# Patient Record
Sex: MALE | Race: WHITE | NOT HISPANIC OR LATINO | Employment: OTHER | URBAN - METROPOLITAN AREA
[De-identification: names, ages, dates, MRNs, and addresses within clinical notes are randomized per-mention and may not be internally consistent; named-entity substitution may affect disease eponyms.]

---

## 2020-11-04 ENCOUNTER — HOSPITAL ENCOUNTER (OUTPATIENT)
Dept: RADIOLOGY | Facility: HOSPITAL | Age: 65
Discharge: HOME | End: 2020-11-04
Attending: NURSE PRACTITIONER
Payer: MEDICARE

## 2020-11-04 ENCOUNTER — OFFICE VISIT (OUTPATIENT)
Dept: UROLOGY | Facility: CLINIC | Age: 65
End: 2020-11-04
Payer: MEDICARE

## 2020-11-04 VITALS
HEIGHT: 68 IN | WEIGHT: 141.75 LBS | BODY MASS INDEX: 21.48 KG/M2 | DIASTOLIC BLOOD PRESSURE: 81 MMHG | HEART RATE: 76 BPM | RESPIRATION RATE: 18 BRPM | SYSTOLIC BLOOD PRESSURE: 132 MMHG

## 2020-11-04 DIAGNOSIS — R10.9 FLANK PAIN: Primary | ICD-10-CM

## 2020-11-04 DIAGNOSIS — N20.0 KIDNEY STONES: ICD-10-CM

## 2020-11-04 DIAGNOSIS — R10.9 ABDOMINAL PAIN, UNSPECIFIED ABDOMINAL LOCATION: ICD-10-CM

## 2020-11-04 LAB
BILIRUB SERPL-MCNC: ABNORMAL MG/DL
BLOOD URINE, POC: ABNORMAL
CLARITY, POC UA: ABNORMAL
COLOR, POC UA: ABNORMAL
GLUCOSE UR QL STRIP: ABNORMAL
KETONES UR QL STRIP: ABNORMAL
LEUKOCYTE ESTERASE URINE, POC: ABNORMAL
NITRITE, POC UA: ABNORMAL
PH, POC UA: 5.5
PROTEIN, POC: 100
SPECIFIC GRAVITY, POC UA: 1.03
UROBILINOGEN, POC UA: 1

## 2020-11-04 PROCEDURE — 88112 PR  CYTOPATH, CELL ENHANCE TECH: ICD-10-PCS | Mod: 26,,, | Performed by: PATHOLOGY

## 2020-11-04 PROCEDURE — 74176 CT ABD & PELVIS W/O CONTRAST: CPT | Mod: 26,,, | Performed by: RADIOLOGY

## 2020-11-04 PROCEDURE — 88112 CYTOPATH CELL ENHANCE TECH: CPT | Mod: 26,,, | Performed by: PATHOLOGY

## 2020-11-04 PROCEDURE — 99204 OFFICE O/P NEW MOD 45 MIN: CPT | Mod: S$PBB,,, | Performed by: NURSE PRACTITIONER

## 2020-11-04 PROCEDURE — 99204 OFFICE O/P NEW MOD 45 MIN: CPT | Mod: PBBFAC,PN,25 | Performed by: NURSE PRACTITIONER

## 2020-11-04 PROCEDURE — 74176 CT RENAL STONE STUDY ABD PELVIS WO: ICD-10-PCS | Mod: 26,,, | Performed by: RADIOLOGY

## 2020-11-04 PROCEDURE — 99999 PR PBB SHADOW E&M-NEW PATIENT-LVL IV: ICD-10-PCS | Mod: PBBFAC,,, | Performed by: NURSE PRACTITIONER

## 2020-11-04 PROCEDURE — 99204 PR OFFICE/OUTPT VISIT, NEW, LEVL IV, 45-59 MIN: ICD-10-PCS | Mod: S$PBB,,, | Performed by: NURSE PRACTITIONER

## 2020-11-04 PROCEDURE — 81002 URINALYSIS NONAUTO W/O SCOPE: CPT | Mod: PBBFAC,PN | Performed by: NURSE PRACTITIONER

## 2020-11-04 PROCEDURE — 88112 CYTOPATH CELL ENHANCE TECH: CPT | Performed by: PATHOLOGY

## 2020-11-04 PROCEDURE — 74176 CT ABD & PELVIS W/O CONTRAST: CPT | Mod: TC

## 2020-11-04 PROCEDURE — 99999 PR PBB SHADOW E&M-NEW PATIENT-LVL IV: CPT | Mod: PBBFAC,,, | Performed by: NURSE PRACTITIONER

## 2020-11-04 PROCEDURE — 87086 URINE CULTURE/COLONY COUNT: CPT

## 2020-11-04 RX ORDER — CIPROFLOXACIN 500 MG/1
500 TABLET ORAL 2 TIMES DAILY
Qty: 10 TABLET | Refills: 0 | Status: ON HOLD | OUTPATIENT
Start: 2020-11-04 | End: 2020-11-06 | Stop reason: ALTCHOICE

## 2020-11-04 RX ORDER — OXYCODONE AND ACETAMINOPHEN 5; 325 MG/1; MG/1
TABLET ORAL
COMMUNITY
Start: 2020-10-30

## 2020-11-04 RX ORDER — TAMSULOSIN HYDROCHLORIDE 0.4 MG/1
0.4 CAPSULE ORAL DAILY
Qty: 30 CAPSULE | Refills: 3 | Status: SHIPPED | OUTPATIENT
Start: 2020-11-04 | End: 2021-11-04

## 2020-11-04 RX ORDER — CEPHALEXIN 250 MG/1
CAPSULE ORAL
COMMUNITY
Start: 2020-10-30 | End: 2020-11-04 | Stop reason: ALTCHOICE

## 2020-11-04 NOTE — PROGRESS NOTES
Called patient to discuss CT results. Probable 5 mm stone in mid left ureter, however read is calling outside of ureter. No hydro. Patient is still having pain.   Offered URS on Friday to further evaluate and remove stone if needed.   Patient stated that he only wants ESWL, however I explained this is not an option due to location of stone. He refused stent placement which I explained is always a possibility with ureteroscopy.   He does not want to undergo procedure at this time, will call if he changes his mind.

## 2020-11-04 NOTE — PROGRESS NOTES
[facility] Note  Staff: JOSE Mauro    PCP: Dr. Reji Hannah    Chief Complaint: Hospital ER f/up-Kidney stones    Subjective:        HPI: Americo Dunham Jr. is a 65 y.o. male NEW PT presents today for ER f/up in regards to kidney stones.    The pt went to [facility] ED ([place], [place]) on 10/30/2020 with flank/abdominal pains.  CT RSS was performed and showed the following:  IMPRESSION:  1.  There is a obstructing 5 x 3 mm stone in the middle of the LEFT ureter, with mild to moderate proximal left hydroureteronephrosis, perinephric and periureteral fat stranding.  2.  Right nephrolithiasis.  A punctate stone is also evident in the dependent aspect of the urinary bladder.  3.  Chronic and incidental findings as described above are stable.    Pt states today that the ER MD at [facility] wanted to admit him, but pt refused due to taking care of his ill mother at his home.  Pt was discharged home with   The following meds:  Keflex-pt had to stop taking yesterday due to causing him diarrhea.  Percocet-pt has not taken, does not like taking pain pills.    NO Family hx of  cancers  Pt is not on any blood thinners at this time.    PT  HX:   Flank Pain; kidney cyst; and Benign Prostatic Hypertrophy  Pt was a long time former patient of Dr Le Roa.  Then pt was referred to Dr. Yomi Lawrence in [facility].  The pt had a LEFT partial nephrectomy for kidney abscess done by Dr. Zamora in [facility] in early part of 2000.     Since partial nephrectomy, pt has hx of recurrent kidney stone disease, chronic pain symptoms especially right rib area.  Was seen by a pain doctor in the past and injection therapy was given.  No nausea, fever or chills.  Went to ER and had CT done in [facility] again back in 2/23/15.     REVIEW OF SYSTEMS:  A comprehensive 10 system review was performed and is negative except as noted above in  HPI    PMHx:  Past Medical History:   Diagnosis Date    Back pain     Chronic pain syndrome     Nephrolithiasis     Polio     age 18 months    Polio     Renal abscess     right partial nephrectomy     PSHx:  Past Surgical History:   Procedure Laterality Date    achilles release Right     CHOLECYSTECTOMY      COLONOSCOPY      unknown    keanu      HERNIA REPAIR Left     inguinal    PARTIAL NEPHRECTOMY Right 2000    VARICOCELECTOMY       Allergies:  Patient has no known allergies.    Medications: reviewed with pt during ov today.    Objective:     Vitals:    11/04/20 1358   BP: 132/81   Pulse: 76   Resp: 18     General:WDWN in NAD  Eyes: PERRLA, normal conjunctiva  Respiratory: no increased work on breathing, clear to auscultation  Cardiovascular: regular rate and rhythm. No obvious extremity edema.  GI: palpation of masses. No tenderness. No hepatosplenomegaly to palpation.  Musculoskeletal: normal range of motion of bilateral upper extremities. Normal muscle strength and tone.  Skin: no obvious rashes or lesions. No tightening of skin noted.  Neurologic: CN grossly normal. Normal sensation.   Psychiatric: awake, alert and oriented x 3. Mood and affect normal. Cooperative.    LABS REVIEW:  UA today:ABNORMAL CC  Color:  Brown tinged urine  Spec. Grav.  1.030  PH  5.5  Protein 100  Trace Ketones  Uro 1.0  Small amt bili  Large amount blood  Assessment:       1. Flank pain    2. Kidney stones    3. Abdominal pain, unspecified abdominal location          Plan:   Left Ureteral stone (5x3 mm) and punctate stone within bladder:    Pt requesting surgical intervention immediately for removal of stones.  I explained thoroughly to this pt during ov today, that we will start the workup for the stones today and contact him ASAP in regards to soon availability for intervention and retrieval of stones.  Pt vu.    1.  CT RSS STAT to be done today for further evaluation and to compare to CT performed on 10/30/2020 [place]  [place].  2.  Pt was instructed to stop the Keflex, Cipro 500 mg BID x 5 days prescribed instead.  3.  Urine culture and cytology to be performed.  4.  Flomax 0.4 mg one tablet daily prescribed to pt today to start today.  5.  Urine strainer, specimen cups given to pt today.    F/u---Reviewed pt's hx and imaging with Dr. Jaelyn Porter during ov today.  Pending repeat CT RSS, possibly can schedule pt for surgery this Friday, November 6th.    F/u-Our office will contact this pt after we receive and review ALL imaging and urine results to determine best POC for this patient.  Pt verbalized understanding at conclusion of appointment today.  Pt was instructed to contact our office should their symptoms worsen or any new  complaints.      [facility]: N/A    Liz Magdaleno, RADHAP-C

## 2020-11-04 NOTE — H&P (VIEW-ONLY)
[facility] Note  Staff: JOSE Mauro    PCP: Dr. Reji Hannah    Chief Complaint: Hospital ER f/up-Kidney stones    Subjective:        HPI: Americo Dunham Jr. is a 65 y.o. male NEW PT presents today for ER f/up in regards to kidney stones.    The pt went to [facility] ED ([place], [place]) on 10/30/2020 with flank/abdominal pains.  CT RSS was performed and showed the following:  IMPRESSION:  1.  There is a obstructing 5 x 3 mm stone in the middle of the LEFT ureter, with mild to moderate proximal left hydroureteronephrosis, perinephric and periureteral fat stranding.  2.  Right nephrolithiasis.  A punctate stone is also evident in the dependent aspect of the urinary bladder.  3.  Chronic and incidental findings as described above are stable.    Pt states today that the ER MD at [facility] wanted to admit him, but pt refused due to taking care of his ill mother at his home.  Pt was discharged home with   The following meds:  Keflex-pt had to stop taking yesterday due to causing him diarrhea.  Percocet-pt has not taken, does not like taking pain pills.    NO Family hx of  cancers  Pt is not on any blood thinners at this time.    PT  HX:   Flank Pain; kidney cyst; and Benign Prostatic Hypertrophy  Pt was a long time former patient of Dr Le Roa.  Then pt was referred to Dr. Yomi Lawrence in [facility].  The pt had a LEFT partial nephrectomy for kidney abscess done by Dr. Zamora in [facility] in early part of 2000.     Since partial nephrectomy, pt has hx of recurrent kidney stone disease, chronic pain symptoms especially right rib area.  Was seen by a pain doctor in the past and injection therapy was given.  No nausea, fever or chills.  Went to ER and had CT done in [facility] again back in 2/23/15.     REVIEW OF SYSTEMS:  A comprehensive 10 system review was performed and is negative except as noted above in  HPI    PMHx:  Past Medical History:   Diagnosis Date    Back pain     Chronic pain syndrome     Nephrolithiasis     Polio     age 18 months    Polio     Renal abscess     right partial nephrectomy     PSHx:  Past Surgical History:   Procedure Laterality Date    achilles release Right     CHOLECYSTECTOMY      COLONOSCOPY      unknown    keanu      HERNIA REPAIR Left     inguinal    PARTIAL NEPHRECTOMY Right 2000    VARICOCELECTOMY       Allergies:  Patient has no known allergies.    Medications: reviewed with pt during ov today.    Objective:     Vitals:    11/04/20 1358   BP: 132/81   Pulse: 76   Resp: 18     General:WDWN in NAD  Eyes: PERRLA, normal conjunctiva  Respiratory: no increased work on breathing, clear to auscultation  Cardiovascular: regular rate and rhythm. No obvious extremity edema.  GI: palpation of masses. No tenderness. No hepatosplenomegaly to palpation.  Musculoskeletal: normal range of motion of bilateral upper extremities. Normal muscle strength and tone.  Skin: no obvious rashes or lesions. No tightening of skin noted.  Neurologic: CN grossly normal. Normal sensation.   Psychiatric: awake, alert and oriented x 3. Mood and affect normal. Cooperative.    LABS REVIEW:  UA today:ABNORMAL CC  Color:  Brown tinged urine  Spec. Grav.  1.030  PH  5.5  Protein 100  Trace Ketones  Uro 1.0  Small amt bili  Large amount blood  Assessment:       1. Flank pain    2. Kidney stones    3. Abdominal pain, unspecified abdominal location          Plan:   Left Ureteral stone (5x3 mm) and punctate stone within bladder:    Pt requesting surgical intervention immediately for removal of stones.  I explained thoroughly to this pt during ov today, that we will start the workup for the stones today and contact him ASAP in regards to soon availability for intervention and retrieval of stones.  Pt vu.    1.  CT RSS STAT to be done today for further evaluation and to compare to CT performed on 10/30/2020 at  [facility].  2.  Pt was instructed to stop the Keflex, Cipro 500 mg BID x 5 days prescribed instead.  3.  Urine culture and cytology to be performed.  4.  Flomax 0.4 mg one tablet daily prescribed to pt today to start today.  5.  Urine strainer, specimen cups given to pt today.    F/u---Reviewed pt's hx and imaging with Dr. Jaelyn Porter during ov today.  Pending repeat CT RSS, possibly can schedule pt for surgery this Friday, November 6th.    F/u-Our office will contact this pt after we receive and review ALL imaging and urine results to determine best POC for this patient.  Pt verbalized understanding at conclusion of appointment today.  Pt was instructed to contact our office should their symptoms worsen or any new  complaints.      [facility]: N/A    Liz Magdaleno, RADHAP-C

## 2020-11-04 NOTE — PATIENT INSTRUCTIONS
Understanding Kidney Stones  Your kidneys are bean-shaped organs. They help filter extra salts, waste, and water from your body. You need to drink enough water every day to help flush the extra salts into your urine.     What are kidney stones?  Kidney stones are made up of chemical crystals that separate out from urine. These crystals clump together to make stones. They form in the calyx of the kidney. They may stay in the kidney or move into the urinary tract.   Why kidney stones form  Kidneys form stones for many reasons. If you dont drink enough water, for instance, you wont have enough urine to dilute chemicals. Then the chemicals may form crystals, which can develop into stones. Here are some reasons why kidney stones form:  · Fluid loss (dehydration). This can concentrate urine, causing stones to form.  · Certain foods. Some foods contain large amounts of the chemicals that sometimes crystallize into stones. Eating foods that contain a lot of meat or salt can lead to more kidney stones.  · Kidney infections. These infections foster stones by slowing urine flow or changing the acid balance of your urine.  · Family history. If family members have had kidney stones, youre more likely to have them, too.  · A lack of certain substances in your urine. Some substances can help protect you from forming stones. If you dont have enough of these in your urine, stone formation can increase.  Where stones form  Stones begin in the cup-shaped part of the kidney (calyx). Some stay in the calyx and grow. Others move into the kidney, pelvis, or into the ureter. There they can lodge, block the flow of urine, and cause pain.  Symptoms  Many stones cause sudden and severe pain and bloody urine. Others cause nausea or frequent, burning urination. Symptoms often depend on your stones size and location. Fever may indicate a serious infection. Call your healthcare provider right away if you develop a fever.  Date Last  Reviewed: 1/1/2017  © 4600-3623 [employer] [place]. [address] [place] [address], [place] [address]. All rights reserved. This information is not intended as a substitute for professional medical care. Always follow your healthcare professional's instructions.

## 2020-11-04 NOTE — H&P (VIEW-ONLY)
[facility] Note  Staff: JOSE Mauro    PCP: Dr. Reji Hannah    Chief Complaint: Hospital ER f/up-Kidney stones    Subjective:        HPI: Americo Dunham Jr. is a 65 y.o. male NEW PT presents today for ER f/up in regards to kidney stones.    The pt went to [facility] ED ([place], [place]) on 10/30/2020 with flank/abdominal pains.  CT RSS was performed and showed the following:  IMPRESSION:  1.  There is a obstructing 5 x 3 mm stone in the middle of the LEFT ureter, with mild to moderate proximal left hydroureteronephrosis, perinephric and periureteral fat stranding.  2.  Right nephrolithiasis.  A punctate stone is also evident in the dependent aspect of the urinary bladder.  3.  Chronic and incidental findings as described above are stable.    Pt states today that the ER MD at [facility] wanted to admit him, but pt refused due to taking care of his ill mother at his home.  Pt was discharged home with   The following meds:  Keflex-pt had to stop taking yesterday due to causing him diarrhea.  Percocet-pt has not taken, does not like taking pain pills.    NO Family hx of  cancers  Pt is not on any blood thinners at this time.    PT  HX:   Flank Pain; kidney cyst; and Benign Prostatic Hypertrophy  Pt was a long time former patient of Dr Le Roa.  Then pt was referred to Dr. Yomi Lawrence in [facility].  The pt had a LEFT partial nephrectomy for kidney abscess done by Dr. Zamora in [facility] in early part of 2000.     Since partial nephrectomy, pt has hx of recurrent kidney stone disease, chronic pain symptoms especially right rib area.  Was seen by a pain doctor in the past and injection therapy was given.  No nausea, fever or chills.  Went to ER and had CT done in [facility] again back in 2/23/15.     REVIEW OF SYSTEMS:  A comprehensive 10 system review was performed and is negative except as noted above in  HPI    PMHx:  Past Medical History:   Diagnosis Date    Back pain     Chronic pain syndrome     Nephrolithiasis     Polio     age 18 months    Polio     Renal abscess     right partial nephrectomy     PSHx:  Past Surgical History:   Procedure Laterality Date    achilles release Right     CHOLECYSTECTOMY      COLONOSCOPY      unknown    keanu      HERNIA REPAIR Left     inguinal    PARTIAL NEPHRECTOMY Right 2000    VARICOCELECTOMY       Allergies:  Patient has no known allergies.    Medications: reviewed with pt during ov today.    Objective:     Vitals:    11/04/20 1358   BP: 132/81   Pulse: 76   Resp: 18     General:WDWN in NAD  Eyes: PERRLA, normal conjunctiva  Respiratory: no increased work on breathing, clear to auscultation  Cardiovascular: regular rate and rhythm. No obvious extremity edema.  GI: palpation of masses. No tenderness. No hepatosplenomegaly to palpation.  Musculoskeletal: normal range of motion of bilateral upper extremities. Normal muscle strength and tone.  Skin: no obvious rashes or lesions. No tightening of skin noted.  Neurologic: CN grossly normal. Normal sensation.   Psychiatric: awake, alert and oriented x 3. Mood and affect normal. Cooperative.    LABS REVIEW:  UA today:ABNORMAL CC  Color:  Brown tinged urine  Spec. Grav.  1.030  PH  5.5  Protein 100  Trace Ketones  Uro 1.0  Small amt bili  Large amount blood  Assessment:       1. Flank pain    2. Kidney stones    3. Abdominal pain, unspecified abdominal location          Plan:   Left Ureteral stone (5x3 mm) and punctate stone within bladder:    Pt requesting surgical intervention immediately for removal of stones.  I explained thoroughly to this pt during ov today, that we will start the workup for the stones today and contact him ASAP in regards to soon availability for intervention and retrieval of stones.  Pt vu.    1.  CT RSS STAT to be done today for further evaluation and to compare to CT performed on 10/30/2020 at  [facility].  2.  Pt was instructed to stop the Keflex, Cipro 500 mg BID x 5 days prescribed instead.  3.  Urine culture and cytology to be performed.  4.  Flomax 0.4 mg one tablet daily prescribed to pt today to start today.  5.  Urine strainer, specimen cups given to pt today.    F/u---Reviewed pt's hx and imaging with Dr. Jaelyn Porter during ov today.  Pending repeat CT RSS, possibly can schedule pt for surgery this Friday, November 6th.    F/u-Our office will contact this pt after we receive and review ALL imaging and urine results to determine best POC for this patient.  Pt verbalized understanding at conclusion of appointment today.  Pt was instructed to contact our office should their symptoms worsen or any new  complaints.      [facility]: N/A    Liz Magdaleno, RADHAP-C

## 2020-11-05 ENCOUNTER — TELEPHONE (OUTPATIENT)
Dept: UROLOGY | Facility: CLINIC | Age: 65
End: 2020-11-05

## 2020-11-05 DIAGNOSIS — N20.0 KIDNEY STONES: Primary | ICD-10-CM

## 2020-11-05 NOTE — TELEPHONE ENCOUNTER
----- Message from Noah Silverman sent at 11/5/2020  9:08 AM CST -----  Regarding: pt  Type: Needs Medical Advice    Who Called:  pt    Best Call Back Number: [phone]   Additional Information: pt was in office yesterday. Discussed having procedure tomorrow. Pt is calling back to discuss this. Please call.

## 2020-11-05 NOTE — PRE-PROCEDURE INSTRUCTIONS
Pre-op phone call made to pt.  All medications reviewed and  pre-procedure  instructions given to pt.  Pt verbalized understanding.Pt to be here for 8:00am.

## 2020-11-06 ENCOUNTER — HOSPITAL ENCOUNTER (OUTPATIENT)
Facility: HOSPITAL | Age: 65
Discharge: HOME | End: 2020-11-06
Attending: STUDENT IN AN ORGANIZED HEALTH CARE EDUCATION/TRAINING PROGRAM | Admitting: STUDENT IN AN ORGANIZED HEALTH CARE EDUCATION/TRAINING PROGRAM
Payer: MEDICARE

## 2020-11-06 ENCOUNTER — TELEPHONE (OUTPATIENT)
Dept: UROLOGY | Facility: CLINIC | Age: 65
End: 2020-11-06

## 2020-11-06 ENCOUNTER — ANESTHESIA EVENT (OUTPATIENT)
Dept: SURGERY | Facility: HOSPITAL | Age: 65
End: 2020-11-06
Payer: MEDICARE

## 2020-11-06 ENCOUNTER — ANESTHESIA (OUTPATIENT)
Dept: SURGERY | Facility: HOSPITAL | Age: 65
End: 2020-11-06
Payer: MEDICARE

## 2020-11-06 DIAGNOSIS — N20.1 LEFT URETERAL CALCULUS: ICD-10-CM

## 2020-11-06 DIAGNOSIS — Z01.818 PRE-OP EXAM: ICD-10-CM

## 2020-11-06 DIAGNOSIS — N20.0 KIDNEY STONES: Primary | ICD-10-CM

## 2020-11-06 DIAGNOSIS — N20.1 LEFT URETERAL CALCULUS: Primary | ICD-10-CM

## 2020-11-06 LAB
ANION GAP SERPL CALC-SCNC: 7 MMOL/L (ref 8–16)
BACTERIA UR CULT: NO GROWTH
BASOPHILS # BLD AUTO: 0.05 K/UL (ref 0–0.2)
BASOPHILS NFR BLD: 0.9 % (ref 0–1.9)
BUN SERPL-MCNC: 19 MG/DL (ref 8–23)
CALCIUM SERPL-MCNC: 9.4 MG/DL (ref 8.7–10.5)
CHLORIDE SERPL-SCNC: 106 MMOL/L (ref 95–110)
CO2 SERPL-SCNC: 28 MMOL/L (ref 23–29)
CREAT SERPL-MCNC: 1.2 MG/DL (ref 0.5–1.4)
DIFFERENTIAL METHOD: NORMAL
EOSINOPHIL # BLD AUTO: 0.3 K/UL (ref 0–0.5)
EOSINOPHIL NFR BLD: 5.1 % (ref 0–8)
ERYTHROCYTE [DISTWIDTH] IN BLOOD BY AUTOMATED COUNT: 12.5 % (ref 11.5–14.5)
EST. GFR  (AFRICAN AMERICAN): >60 ML/MIN/1.73 M^2
EST. GFR  (NON AFRICAN AMERICAN): >60 ML/MIN/1.73 M^2
GLUCOSE SERPL-MCNC: 95 MG/DL (ref 70–110)
HCT VFR BLD AUTO: 46 % (ref 40–54)
HGB BLD-MCNC: 15 G/DL (ref 14–18)
IMM GRANULOCYTES # BLD AUTO: 0.02 K/UL (ref 0–0.04)
IMM GRANULOCYTES NFR BLD AUTO: 0.3 % (ref 0–0.5)
LYMPHOCYTES # BLD AUTO: 1.1 K/UL (ref 1–4.8)
LYMPHOCYTES NFR BLD: 18.9 % (ref 18–48)
MCH RBC QN AUTO: 30.3 PG (ref 27–31)
MCHC RBC AUTO-ENTMCNC: 32.6 G/DL (ref 32–36)
MCV RBC AUTO: 93 FL (ref 82–98)
MONOCYTES # BLD AUTO: 0.6 K/UL (ref 0.3–1)
MONOCYTES NFR BLD: 9.7 % (ref 4–15)
NEUTROPHILS # BLD AUTO: 3.8 K/UL (ref 1.8–7.7)
NEUTROPHILS NFR BLD: 65.1 % (ref 38–73)
NRBC BLD-RTO: 0 /100 WBC
PLATELET # BLD AUTO: 225 K/UL (ref 150–350)
PMV BLD AUTO: 10.1 FL (ref 9.2–12.9)
POTASSIUM SERPL-SCNC: 4.3 MMOL/L (ref 3.5–5.1)
RBC # BLD AUTO: 4.95 M/UL (ref 4.6–6.2)
SARS-COV-2 RDRP RESP QL NAA+PROBE: NEGATIVE
SODIUM SERPL-SCNC: 141 MMOL/L (ref 136–145)
WBC # BLD AUTO: 5.86 K/UL (ref 3.9–12.7)

## 2020-11-06 PROCEDURE — 25000003 PHARM REV CODE 250: Performed by: NURSE ANESTHETIST, CERTIFIED REGISTERED

## 2020-11-06 PROCEDURE — 82365 CALCULUS SPECTROSCOPY: CPT

## 2020-11-06 PROCEDURE — D9220A PRA ANESTHESIA: Mod: CRNA,,, | Performed by: NURSE ANESTHETIST, CERTIFIED REGISTERED

## 2020-11-06 PROCEDURE — 99900104 DSU ONLY-NO CHARGE-EA ADD'L HR (STAT): Performed by: STUDENT IN AN ORGANIZED HEALTH CARE EDUCATION/TRAINING PROGRAM

## 2020-11-06 PROCEDURE — C2617 STENT, NON-COR, TEM W/O DEL: HCPCS | Performed by: STUDENT IN AN ORGANIZED HEALTH CARE EDUCATION/TRAINING PROGRAM

## 2020-11-06 PROCEDURE — 93010 EKG 12-LEAD: ICD-10-PCS | Mod: ,,, | Performed by: SPECIALIST

## 2020-11-06 PROCEDURE — 52332 CYSTOSCOPY AND TREATMENT: CPT | Mod: LT,,, | Performed by: STUDENT IN AN ORGANIZED HEALTH CARE EDUCATION/TRAINING PROGRAM

## 2020-11-06 PROCEDURE — 71000016 HC POSTOP RECOV ADDL HR: Performed by: STUDENT IN AN ORGANIZED HEALTH CARE EDUCATION/TRAINING PROGRAM

## 2020-11-06 PROCEDURE — 71000015 HC POSTOP RECOV 1ST HR: Performed by: STUDENT IN AN ORGANIZED HEALTH CARE EDUCATION/TRAINING PROGRAM

## 2020-11-06 PROCEDURE — U0002 COVID-19 LAB TEST NON-CDC: HCPCS

## 2020-11-06 PROCEDURE — 36415 COLL VENOUS BLD VENIPUNCTURE: CPT

## 2020-11-06 PROCEDURE — 71000033 HC RECOVERY, INTIAL HOUR: Performed by: STUDENT IN AN ORGANIZED HEALTH CARE EDUCATION/TRAINING PROGRAM

## 2020-11-06 PROCEDURE — D9220A PRA ANESTHESIA: ICD-10-PCS | Mod: CRNA,,, | Performed by: NURSE ANESTHETIST, CERTIFIED REGISTERED

## 2020-11-06 PROCEDURE — 99900103 DSU ONLY-NO CHARGE-INITIAL HR (STAT): Performed by: STUDENT IN AN ORGANIZED HEALTH CARE EDUCATION/TRAINING PROGRAM

## 2020-11-06 PROCEDURE — 74420 UROGRAPHY RTRGR +-KUB: CPT | Mod: 26,,, | Performed by: STUDENT IN AN ORGANIZED HEALTH CARE EDUCATION/TRAINING PROGRAM

## 2020-11-06 PROCEDURE — 93005 ELECTROCARDIOGRAM TRACING: CPT | Mod: 59

## 2020-11-06 PROCEDURE — 25000003 PHARM REV CODE 250: Performed by: ANESTHESIOLOGY

## 2020-11-06 PROCEDURE — 27200651 HC AIRWAY, LMA: Performed by: ANESTHESIOLOGY

## 2020-11-06 PROCEDURE — 93010 ELECTROCARDIOGRAM REPORT: CPT | Mod: ,,, | Performed by: SPECIALIST

## 2020-11-06 PROCEDURE — C1769 GUIDE WIRE: HCPCS | Performed by: STUDENT IN AN ORGANIZED HEALTH CARE EDUCATION/TRAINING PROGRAM

## 2020-11-06 PROCEDURE — 36000706: Performed by: STUDENT IN AN ORGANIZED HEALTH CARE EDUCATION/TRAINING PROGRAM

## 2020-11-06 PROCEDURE — 63600175 PHARM REV CODE 636 W HCPCS: Performed by: NURSE ANESTHETIST, CERTIFIED REGISTERED

## 2020-11-06 PROCEDURE — 63600175 PHARM REV CODE 636 W HCPCS: Performed by: STUDENT IN AN ORGANIZED HEALTH CARE EDUCATION/TRAINING PROGRAM

## 2020-11-06 PROCEDURE — 85025 COMPLETE CBC W/AUTO DIFF WBC: CPT

## 2020-11-06 PROCEDURE — 36000707: Performed by: STUDENT IN AN ORGANIZED HEALTH CARE EDUCATION/TRAINING PROGRAM

## 2020-11-06 PROCEDURE — 52332 PR CYSTOSCOPY,INSERT URETERAL STENT: ICD-10-PCS | Mod: LT,,, | Performed by: STUDENT IN AN ORGANIZED HEALTH CARE EDUCATION/TRAINING PROGRAM

## 2020-11-06 PROCEDURE — 74420 PR  X-RAY RETROGRADE PYELOGRAM: ICD-10-PCS | Mod: 26,,, | Performed by: STUDENT IN AN ORGANIZED HEALTH CARE EDUCATION/TRAINING PROGRAM

## 2020-11-06 PROCEDURE — 80048 BASIC METABOLIC PNL TOTAL CA: CPT

## 2020-11-06 PROCEDURE — 37000008 HC ANESTHESIA 1ST 15 MINUTES: Performed by: STUDENT IN AN ORGANIZED HEALTH CARE EDUCATION/TRAINING PROGRAM

## 2020-11-06 PROCEDURE — 25500020 PHARM REV CODE 255: Performed by: STUDENT IN AN ORGANIZED HEALTH CARE EDUCATION/TRAINING PROGRAM

## 2020-11-06 PROCEDURE — D9220A PRA ANESTHESIA: ICD-10-PCS | Mod: ANES,,, | Performed by: ANESTHESIOLOGY

## 2020-11-06 PROCEDURE — D9220A PRA ANESTHESIA: Mod: ANES,,, | Performed by: ANESTHESIOLOGY

## 2020-11-06 PROCEDURE — 37000009 HC ANESTHESIA EA ADD 15 MINS: Performed by: STUDENT IN AN ORGANIZED HEALTH CARE EDUCATION/TRAINING PROGRAM

## 2020-11-06 DEVICE — STENT URETERAL UNIV 6FR 26CM: Type: IMPLANTABLE DEVICE | Site: URETER | Status: FUNCTIONAL

## 2020-11-06 RX ORDER — OXYCODONE AND ACETAMINOPHEN 5; 325 MG/1; MG/1
1 TABLET ORAL EVERY 4 HOURS PRN
Qty: 15 TABLET | Refills: 0 | Status: SHIPPED | OUTPATIENT
Start: 2020-11-06

## 2020-11-06 RX ORDER — FENTANYL CITRATE 50 UG/ML
INJECTION, SOLUTION INTRAMUSCULAR; INTRAVENOUS
Status: DISCONTINUED | OUTPATIENT
Start: 2020-11-06 | End: 2020-11-06

## 2020-11-06 RX ORDER — FENTANYL CITRATE 50 UG/ML
25 INJECTION, SOLUTION INTRAMUSCULAR; INTRAVENOUS EVERY 5 MIN PRN
Status: DISCONTINUED | OUTPATIENT
Start: 2020-11-06 | End: 2020-11-06 | Stop reason: HOSPADM

## 2020-11-06 RX ORDER — MIDAZOLAM HYDROCHLORIDE 1 MG/ML
INJECTION, SOLUTION INTRAMUSCULAR; INTRAVENOUS
Status: DISCONTINUED | OUTPATIENT
Start: 2020-11-06 | End: 2020-11-06

## 2020-11-06 RX ORDER — CEFAZOLIN SODIUM 2 G/50ML
2 SOLUTION INTRAVENOUS
Status: COMPLETED | OUTPATIENT
Start: 2020-11-06 | End: 2020-11-06

## 2020-11-06 RX ORDER — OXYBUTYNIN CHLORIDE 5 MG/1
5 TABLET ORAL 3 TIMES DAILY PRN
Qty: 30 TABLET | Refills: 0 | Status: SHIPPED | OUTPATIENT
Start: 2020-11-06

## 2020-11-06 RX ORDER — LIDOCAINE HCL/PF 100 MG/5ML
SYRINGE (ML) INTRAVENOUS
Status: DISCONTINUED | OUTPATIENT
Start: 2020-11-06 | End: 2020-11-06

## 2020-11-06 RX ORDER — SODIUM CHLORIDE 9 MG/ML
INJECTION, SOLUTION INTRAVENOUS CONTINUOUS
Status: DISCONTINUED | OUTPATIENT
Start: 2020-11-06 | End: 2020-11-06 | Stop reason: HOSPADM

## 2020-11-06 RX ORDER — ONDANSETRON 4 MG/1
8 TABLET, ORALLY DISINTEGRATING ORAL EVERY 8 HOURS PRN
Status: DISCONTINUED | OUTPATIENT
Start: 2020-11-06 | End: 2020-11-06 | Stop reason: HOSPADM

## 2020-11-06 RX ORDER — ONDANSETRON HYDROCHLORIDE 2 MG/ML
INJECTION, SOLUTION INTRAMUSCULAR; INTRAVENOUS
Status: DISCONTINUED | OUTPATIENT
Start: 2020-11-06 | End: 2020-11-06

## 2020-11-06 RX ORDER — ACETAMINOPHEN 10 MG/ML
INJECTION, SOLUTION INTRAVENOUS
Status: DISCONTINUED | OUTPATIENT
Start: 2020-11-06 | End: 2020-11-06

## 2020-11-06 RX ORDER — KETOROLAC TROMETHAMINE 30 MG/ML
30 INJECTION, SOLUTION INTRAMUSCULAR; INTRAVENOUS ONCE
Status: DISCONTINUED | OUTPATIENT
Start: 2020-11-06 | End: 2020-11-06 | Stop reason: HOSPADM

## 2020-11-06 RX ORDER — SCOLOPAMINE TRANSDERMAL SYSTEM 1 MG/1
1 PATCH, EXTENDED RELEASE TRANSDERMAL
Status: DISCONTINUED | OUTPATIENT
Start: 2020-11-06 | End: 2020-11-06 | Stop reason: HOSPADM

## 2020-11-06 RX ORDER — PROPOFOL 10 MG/ML
VIAL (ML) INTRAVENOUS
Status: DISCONTINUED | OUTPATIENT
Start: 2020-11-06 | End: 2020-11-06

## 2020-11-06 RX ORDER — OXYCODONE HYDROCHLORIDE 5 MG/1
5 TABLET ORAL ONCE AS NEEDED
Status: COMPLETED | OUTPATIENT
Start: 2020-11-06 | End: 2020-11-06

## 2020-11-06 RX ORDER — DEXAMETHASONE SODIUM PHOSPHATE 4 MG/ML
INJECTION, SOLUTION INTRA-ARTICULAR; INTRALESIONAL; INTRAMUSCULAR; INTRAVENOUS; SOFT TISSUE
Status: DISCONTINUED | OUTPATIENT
Start: 2020-11-06 | End: 2020-11-06

## 2020-11-06 RX ORDER — ONDANSETRON 2 MG/ML
4 INJECTION INTRAMUSCULAR; INTRAVENOUS ONCE AS NEEDED
Status: DISCONTINUED | OUTPATIENT
Start: 2020-11-06 | End: 2020-11-06 | Stop reason: HOSPADM

## 2020-11-06 RX ORDER — LIDOCAINE HYDROCHLORIDE 20 MG/ML
JELLY TOPICAL
Status: DISCONTINUED | OUTPATIENT
Start: 2020-11-06 | End: 2020-11-06 | Stop reason: HOSPADM

## 2020-11-06 RX ADMIN — SCOPALAMINE 1 PATCH: 1 PATCH, EXTENDED RELEASE TRANSDERMAL at 09:11

## 2020-11-06 RX ADMIN — ACETAMINOPHEN 1000 MG: 10 INJECTION, SOLUTION INTRAVENOUS at 10:11

## 2020-11-06 RX ADMIN — PROPOFOL 150 MG: 10 INJECTION, EMULSION INTRAVENOUS at 10:11

## 2020-11-06 RX ADMIN — DEXAMETHASONE SODIUM PHOSPHATE 4 MG: 4 INJECTION, SOLUTION INTRA-ARTICULAR; INTRALESIONAL; INTRAMUSCULAR; INTRAVENOUS; SOFT TISSUE at 10:11

## 2020-11-06 RX ADMIN — ONDANSETRON 4 MG: 2 INJECTION, SOLUTION INTRAMUSCULAR; INTRAVENOUS at 10:11

## 2020-11-06 RX ADMIN — SODIUM CHLORIDE, SODIUM GLUCONATE, SODIUM ACETATE, POTASSIUM CHLORIDE, MAGNESIUM CHLORIDE, SODIUM PHOSPHATE, DIBASIC, AND POTASSIUM PHOSPHATE: .53; .5; .37; .037; .03; .012; .00082 INJECTION, SOLUTION INTRAVENOUS at 10:11

## 2020-11-06 RX ADMIN — FENTANYL CITRATE 50 MCG: 50 INJECTION, SOLUTION INTRAMUSCULAR; INTRAVENOUS at 10:11

## 2020-11-06 RX ADMIN — SODIUM CHLORIDE, SODIUM GLUCONATE, SODIUM ACETATE, POTASSIUM CHLORIDE, MAGNESIUM CHLORIDE, SODIUM PHOSPHATE, DIBASIC, AND POTASSIUM PHOSPHATE: .53; .5; .37; .037; .03; .012; .00082 INJECTION, SOLUTION INTRAVENOUS at 09:11

## 2020-11-06 RX ADMIN — CEFAZOLIN SODIUM 2 G: 2 SOLUTION INTRAVENOUS at 10:11

## 2020-11-06 RX ADMIN — LIDOCAINE HYDROCHLORIDE 75 MG: 20 INJECTION, SOLUTION INTRAVENOUS at 10:11

## 2020-11-06 RX ADMIN — OXYCODONE HYDROCHLORIDE 5 MG: 5 TABLET ORAL at 11:11

## 2020-11-06 RX ADMIN — MIDAZOLAM 2 MG: 1 INJECTION INTRAMUSCULAR; INTRAVENOUS at 10:11

## 2020-11-06 NOTE — TRANSFER OF CARE
Anesthesia Transfer of Care Note    Patient: Americo Dunham Jr.    Procedure(s) Performed: Procedure(s) (LRB):  INSERTION, STENT, URETER (N/A)  REMOVAL, CALCULUS, BLADDER (N/A)  URETEROSCOPY (N/A)    Patient location: PACU    Anesthesia Type: general    Transport from OR: Transported from OR on 2-3 L/min O2 by NC with adequate spontaneous ventilation    Post pain: adequate analgesia    Post assessment: no apparent anesthetic complications and tolerated procedure well    Post vital signs: stable    Level of consciousness: awake, alert and oriented    Nausea/Vomiting: no nausea/vomiting    Complications: none    Transfer of care protocol was followed      Last vitals:   Visit Vitals  BP (!) 102/55 (BP Location: Right arm, Patient Position: Lying)   Pulse 68   Temp 36.5 °C (97.7 °F) (Temporal)   Resp 16   SpO2 95%

## 2020-11-06 NOTE — PLAN OF CARE
Phone called to Dr. Porter with pt on speaker phone. Pt still unable to void. Bladder scanner 300mL. Dr. Porter wanted to have a catheter place till Monday but pt refused. But Pt agree to place harris catheter and to wait and see if he can handle it the catheter in. Pt thinks he will have to go to the ER due to pain from catheter. Dr. Porter informed pt she sent him home the medication for pain. Lidocaine jelly ordered per Dr. Porter.

## 2020-11-06 NOTE — INTERVAL H&P NOTE
The patient has been examined and the H&P has been reviewed:    I concur with the findings and no changes have occurred since H&P was written.    Surgery risks, benefits and alternative options discussed and understood by patient/family.    To OR today for left URS      Active Hospital Problems    Diagnosis  POA    Left ureteral calculus [N20.1]  Yes      Resolved Hospital Problems   No resolved problems to display.

## 2020-11-06 NOTE — TELEPHONE ENCOUNTER
----- Message from Jaelyn Porter MD sent at 11/6/2020 12:11 PM CST -----  Please schedule COVID prior to stent removal on 11/18  Thanks

## 2020-11-06 NOTE — DISCHARGE SUMMARY
[facility]  Discharge Note  Short Stay    Admit Date: 11/6/2020    Discharge Date and Time: 11/06/2020 11:12 AM      Attending Physician: Jaelyn Porter MD     Discharge Provider: Jaelyn Porter    Diagnoses:  Active Hospital Problems    Diagnosis  POA    *Left ureteral calculus [N20.1]  Yes    BPH (benign prostatic hypertrophy) [N40.0]  Yes     Dx updated per 2019 IMO Load        Resolved Hospital Problems   No resolved problems to display.       Discharged Condition: good    Hospital Course: Patient was admitted for left URS and tolerated the procedure well with no complications. The patient was discharged home in good condition on the same day.       Final Diagnoses: Same as principal problem.    Disposition: Home or Self Care    Follow up/Patient Instructions:    Medications:  Reconciled Home Medications:   Current Discharge Medication List      START taking these medications    Details   oxybutynin (DITROPAN) 5 MG Tab Take 1 tablet (5 mg total) by mouth 3 (three) times daily as needed.  Qty: 30 tablet, Refills: 0      !! oxyCODONE-acetaminophen (PERCOCET) 5-325 mg per tablet Take 1 tablet by mouth every 4 (four) hours as needed for Pain.  Qty: 15 tablet, Refills: 0       !! - Potential duplicate medications found. Please discuss with provider.      CONTINUE these medications which have NOT CHANGED    Details   loratadine (CLARITIN) 10 mg tablet Take 1 tablet (10 mg total) by mouth once daily.  Qty: 30 tablet, Refills: 2    Associated Diagnoses: Chronic coughing      !! oxyCODONE-acetaminophen (PERCOCET) 5-325 mg per tablet     Comments: Quantity prescribed more than 7 day supply? Press F2 and select one:[id number]        pantoprazole (PROTONIX) 40 MG tablet Take 1 tablet (40 mg total) by mouth once daily.  Qty: 30 tablet, Refills: 2    Associated Diagnoses: Chronic coughing      tamsulosin (FLOMAX) 0.4 mg Cap Take 1 capsule (0.4 mg total) by mouth once daily. Take at bedtime  Qty: 30 capsule,  Refills: 3       !! - Potential duplicate medications found. Please discuss with provider.        Discharge Procedure Orders   Diet general     Call MD for:  temperature >100.4     Call MD for:  persistent nausea and vomiting     Call MD for:  severe uncontrolled pain     No dressing needed     Activity as tolerated     Follow-up Information     Jaelyn Porter MD On 11/18/2020.    Specialty: Urology  Why: stent removal  Contact information:  [address] [place] [address]  [address] [place] [id number]  [phone]                   Jaelyn Porter MD  Urology Department

## 2020-11-06 NOTE — PLAN OF CARE
Bladder scanned done again- 250mL. Dr. Porter informed- stated to wait. Dr. Su informed of pt in pain- ordered ketorolac 30mg IV

## 2020-11-06 NOTE — PLAN OF CARE
Dr. Porter came back to assess pt. Dr. Porter gave pt some options- pt agreed to have and in & out catheter and to wait to use the restroom. In and out performed 450mL out. Red urine with clots. Pt stated he felt better. New bag of IV fluids hung and apple juice given to pt.

## 2020-11-06 NOTE — ANESTHESIA PROCEDURE NOTES
Intubation  Performed by: Brian Faria CRNA  Authorized by: Gilberto Su MD     Intubation:     Induction:  Intravenous    Intubated:  Postinduction    Attempted By:  CRNA    Difficult Airway Encountered?: No      Complications:  None    Airway Device:  Supraglottic airway/LMA    Airway Device Size:  4.0    Style/Cuff Inflation:  Cuffed (inflated to minimal occlusive pressure)    Secured at:  The lips    Placement Verified By:  Capnometry    Complicating Factors:  None

## 2020-11-06 NOTE — OP NOTE
[facility]  Operative Note    Date: 11/06/2020    Pre-Op Diagnosis:   1. left ureteral stone    Patient Active Problem List   Diagnosis    Nephrolithiasis    Right flank pain, chronic    BPH (benign prostatic hypertrophy)    Intercostal neuralgia    Myalgia and myositis    Facet syndrome    Left ureteral calculus       Post-Op Diagnosis: same    Procedure(s) Performed:   1.  Left ureteroscopy  2.  Cystoscopy  3.  Left retrograde pyelogram  4.  Left ureteral stent placement  5.  Stone extraction   6.  Fluoro < 1 h    Specimen(s): stone for analysis    Staff Surgeon: Jaelyn Porter MD    Anesthesia: General endotracheal anesthesia    Indications: Americo Dunham Jr. is a 65 y.o. male with a left ureteral stone, presenting for definitive stone management.  He currently does not have a JJ ureteral stent in place.      Findings:   - few stones present within bladder, removed  - difficultly advancing the ureteroscope up through the mid ureter secondary to edema and tightness  - able to advance flexible scope over wire, no stones seen within the ureter  - small amount of extravasation seen on RGP    Estimated Blood Loss: min    Drains: 6 Fr x 26 cm JJ ureteral stent without strings    Procedure in detail:  After informed consent was obtained, the patient was brought the the cystoscopy suite and placed in the supine position.  SCDs were applied and working.  Anesthesia was administered.  The patient was then placed in the dorsal lithotomy position and prepped and draped in the usual sterile fashion.      A rigid cystoscope in a 22 Fr sheath was introduced into the patient's urethra.  This passed easily.  The entire urethra was visualized which showed no strictures or masses.  Formal cystoscopy was performed which revealed no masses or lesions suspicious for malignancy, no bladder diverticuli, no trabeculations.  The ureteral orifices were visualized in the normal anatomic position bilaterally.  There were a few small stones present within the bladder including an approx 5 mm stone which may have passed from the ureter.     A motion wire was passed up the left ureteral orifice and up into the kidney.  This passed easily and placement was confirmed using fluoro.  The cystoscope was removed keeping the guidewire in place and the wire was secured to the drape.      An 8 Fr rigid ureteroscope was passed into the patient's bladder alongside the wire under direct vision.  It was then passed through the left ureteral orifice alongside the wire. There was difficultly passing the scope into the mid ureter. A attempted to place a second wire however and pass the scope over however this was also unsuccessful. The scope was removed. I then passed a flexible ureteroscope over the wire into the kidney. Again there was a lot of tightness at the mid ureter. Visualization was very difficult in this area however no stones were seen. A RGP was performed which showed a small amount of extravasation.  The scope was removed.     A 6 Fr x 26 cm JJ ureteral stent without strings was passed over the wire and up into the renal pelvis using fluoro and direct vision.       The patient tolerated the procedure well and was transferred to the recovery room in stable condition.      Disposition:  The patient will follow up in 2 weeks for stent removal.       Jaelyn Porter MD

## 2020-11-06 NOTE — ANESTHESIA PREPROCEDURE EVALUATION
11/06/2020  Americo Dunham Jr. is a 65 y.o., male.    Anesthesia Evaluation    I have reviewed the Patient Summary Reports.    I have reviewed the Nursing Notes. I have reviewed the NPO Status.   I have reviewed the Medications.     Review of Systems  Anesthesia Hx:  No problems with previous Anesthesia    Social:  Non-Smoker    Hematology/Oncology:  Hematology Normal   Oncology Normal     EENT/Dental:EENT/Dental Normal   Pulmonary:  Pulmonary Normal    Renal/:   Chronic Renal Disease renal calculi BPH    Musculoskeletal:  Musculoskeletal Normal    Neurological:   Neuromuscular Disease, H/o polio   Chronic Pain Syndrome   Dermatological:  Skin Normal    Psych:  Psychiatric Normal           Physical Exam  General:  Well nourished    Airway/Jaw/Neck:  Airway Findings: Mouth Opening: Normal Tongue: Normal  General Airway Assessment: Adult  Mallampati: II  TM Distance: Normal, at least 6 cm        Eyes/Ears/Nose:  EYES/EARS/NOSE FINDINGS: Normal   Dental:  Dental Findings:Discolored right front upper tooth. Not loose per patient    Chest/Lungs:  Chest/Lungs Findings: Clear to auscultation, Normal Respiratory Rate     Heart/Vascular:  Heart Findings: Rate: Normal  Rhythm: Regular Rhythm        Mental Status:  Mental Status Findings:  Cooperative, Alert and Oriented         Anesthesia Plan  Type of Anesthesia, risks & benefits discussed:  Anesthesia Type:  general  Patient's Preference:   Intra-op Monitoring Plan: standard ASA monitors  Intra-op Monitoring Plan Comments:   Post Op Pain Control Plan:   Post Op Pain Control Plan Comments:   Induction:   IV  Beta Blocker:  Patient is not currently on a Beta-Blocker (No further documentation required).       Informed Consent: Patient understands risks and agrees with Anesthesia plan.  Questions answered. Anesthesia consent signed with patient.  ASA Score: 2     Day  of Surgery Review of History & Physical: I have interviewed and examined the patient. I have reviewed the patient's H&P dated:    H&P update referred to the provider.         Ready For Surgery From Anesthesia Perspective.

## 2020-11-06 NOTE — ANESTHESIA POSTPROCEDURE EVALUATION
Anesthesia Post Evaluation    Patient: Americo Dunham Jr.    Procedure(s) Performed: Procedure(s) (LRB):  INSERTION, STENT, URETER (N/A)  REMOVAL, CALCULUS, BLADDER (N/A)  URETEROSCOPY (N/A)    Final Anesthesia Type: general    Patient location during evaluation: PACU  Patient participation: Yes- Able to Participate  Level of consciousness: awake and alert  Post-procedure vital signs: reviewed and stable  Pain management: adequate  Airway patency: patent    PONV status at discharge: No PONV  Anesthetic complications: no      Cardiovascular status: hemodynamically stable  Respiratory status: unassisted and room air  Hydration status: euvolemic  Follow-up not needed.          Vitals Value Taken Time   /74 11/06/20 1206   Temp 36.6 °C (97.9 °F) 11/06/20 1159   Pulse 56 11/06/20 1206   Resp 16 11/06/20 1206   SpO2 99 % 11/06/20 1206         Event Time   Out of Recovery 12:01:34         Pain/Antoine Score: Pain Rating Prior to Med Admin: 6 (pressure feels need to urinate) (11/6/2020 11:53 AM)  Antoine Score: 10 (11/6/2020 12:06 PM)

## 2020-11-06 NOTE — DISCHARGE INSTRUCTIONS
"Discharge Instructions: After Your Surgery/Procedure  Youve just had surgery. During surgery you were given medicine called anesthesia to keep you relaxed and free of pain. After surgery you may have some pain or nausea. This is common. Here are some tips for feeling better and getting well after surgery.     Stay on schedule with your medication.   Going home  Your doctor or nurse will show you how to take care of yourself when you go home. He or she will also answer your questions. Have an adult family member or friend drive you home.      For your safety we recommend these precaution for the first 24 hours after your procedure:  · Do not drive or use heavy equipment.  · Do not make important decisions or sign legal papers.  · Do not drink alcohol.  · Have someone stay with you, if needed. He or she can watch for problems and help keep you safe.  · Your concentration, balance, coordination, and judgement may be impaired for many hours after anesthesia.  Use caution when ambulating or standing up.     · You may feel weak and "washed out" after anesthesia and surgery.      Subtle residual effects of general anesthesia or sedation with regional / local anesthesia can last more than 24 hours.  Rest for the remainder of the day or longer if your Doctor/Surgeon has advised you to do so.  Although you may feel normal within the first 24 hours, your reflexes and mental ability may be impaired without you realizing it.  You may feel dizzy, lightheaded or sleepy for 24 hours or longer.      Be sure to go to all follow-up visits with your doctor. And rest after your surgery for as long as your doctor tells you to.  Coping with pain  If you have pain after surgery, pain medicine will help you feel better. Take it as told, before pain becomes severe. Also, ask your doctor or pharmacist about other ways to control pain. This might be with heat, ice, or relaxation. And follow any other instructions your surgeon or nurse gives " you.  Tips for taking pain medicine  To get the best relief possible, remember these points:  · Pain medicines can upset your stomach. Taking them with a little food may help.  · Most pain relievers taken by mouth need at least 20 to 30 minutes to start to work.  · Taking medicine on a schedule can help you remember to take it. Try to time your medicine so that you can take it before starting an activity. This might be before you get dressed, go for a walk, or sit down for dinner.  · Constipation is a common side effect of pain medicines. Call your doctor before taking any medicines such as laxatives or stool softeners to help ease constipation. Also ask if you should skip any foods. Drinking lots of fluids and eating foods such as fruits and vegetables that are high in fiber can also help. Remember, do not take laxatives unless your surgeon has prescribed them.  · Drinking alcohol and taking pain medicine can cause dizziness and slow your breathing. It can even be deadly. Do not drink alcohol while taking pain medicine.  · Pain medicine can make you react more slowly to things. Do not drive or run machinery while taking pain medicine.  Your health care provider may tell you to take acetaminophen to help ease your pain. Ask him or her how much you are supposed to take each day. Acetaminophen or other pain relievers may interact with your prescription medicines or other over-the-counter (OTC) drugs. Some prescription medicines have acetaminophen and other ingredients. Using both prescription and OTC acetaminophen for pain can cause you to overdose. Read the labels on your OTC medicines with care. This will help you to clearly know the list of ingredients, how much to take, and any warnings. It may also help you not take too much acetaminophen. If you have questions or do not understand the information, ask your pharmacist or health care provider to explain it to you before you take the OTC medicine.  Managing  nausea  Some people have an upset stomach after surgery. This is often because of anesthesia, pain, or pain medicine, or the stress of surgery. These tips will help you handle nausea and eat healthy foods as you get better. If you were on a special food plan before surgery, ask your doctor if you should follow it while you get better. These tips may help:  · Do not push yourself to eat. Your body will tell you when to eat and how much.  · Start off with clear liquids and soup. They are easier to digest.  · Next try semi-solid foods, such as mashed potatoes, applesauce, and gelatin, as you feel ready.  · Slowly move to solid foods. Dont eat fatty, rich, or spicy foods at first.  · Do not force yourself to have 3 large meals a day. Instead eat smaller amounts more often.  · Take pain medicines with a small amount of solid food, such as crackers or toast, to avoid nausea.     Call your surgeon if  · You still have pain an hour after taking medicine. The medicine may not be strong enough.  · You feel too sleepy, dizzy, or groggy. The medicine may be too strong.  · You have side effects like nausea, vomiting, or skin changes, such as rash, itching, or hives.       If you have obstructive sleep apnea  You were given anesthesia medicine during surgery to keep you comfortable and free of pain. After surgery, you may have more apnea spells because of this medicine and other medicines you were given. The spells may last longer than usual.   At home:  · Keep using the continuous positive airway pressure (CPAP) device when you sleep. Unless your health care provider tells you not to, use it when you sleep, day or night. CPAP is a common device used to treat obstructive sleep apnea.  · Talk with your provider before taking any pain medicine, muscle relaxants, or sedatives. Your provider will tell you about the possible dangers of taking these medicines.  © 6682-5930 [employer]. [address] [place] [address]  [place] [address]. All rights reserved. This information is not intended as a substitute for professional medical care. Always follow your healthcare professional's instructions.    Post op instructions for prevention of DVT  What is deep vein thrombosis?  Deep vein thrombosis (DVT) is the medical term for blood clots in the deep veins of the leg.  These blood clots can be dangerous.  A DVT can block a blood vessel and keep blood from getting where it needs to go.  Another problem is that the clot can travel to other parts of the body such as the lungs.  A clot that travels to the lungs is called a pulmonary embolus (PE) and can cause serious problems with breathing which can lead to death.  Am I at risk for DVT/PE?  If you are not very active, you are at risk of DVT.  Anyone confined to bed, sitting for long periods of time, recovering from surgery, etc. increases the risk of DVT.  Other risk factors are cancer diagnosis, certain medications, estrogen replacement in any form,older age, obesity, pregnancy, smoking, history of clotting disorders, and dehydration.  How will I know if I have a DVT?   Swelling in the lower leg   Pain   Warmth, redness, hardness or bulging of the vein  If you have any of these symptoms, call your doctors office right away.  Some people will not have any symptoms until the clot moves to the lungs.  What are the symptoms of a PE?   Panting, shortness of breath, or trouble breathing   Sharp, knife-like chest pain when you breathe   Coughing or coughing up blood   Rapid heartbeat  If you have any of these symptoms or get worse quickly, call 911 for emergency treatment.  How can I prevent a DVT?   Avoid long periods of inactivity and dont cross your legs--get up and walk around every hour or so.   Stay active--walking after surgery is highly encouraged.  This means you should get out of the house and walk in the neighborhood.  Going up and down stairs will not impair healing (unless advised  against such activity by your doctor).     Drink plenty of noncaffeinated, nonalcoholic fluids each day to prevent dehydration.   Wear special support stockings, if they have been advised by your doctor.   If you travel, stop at least once an hour and walk around.   Avoid smoking (assistance with stopping is available through your healthcare provider)  Always notify your doctor if you are not able to follow the post operative instructions that are given to you at the time of discharge.  It may be necessary to prescribe one of the medications available to prevent DVT.    We hope your stay was comfortable as you heal now, mend and rest.    For we have enjoyed taking care of you by giving your our best.    And as you get better, by regaining your health and strength;   We count it as a privilege to have served you and hope your time at [facility] was well spent.      Thank  You!!!

## 2020-11-07 NOTE — PLAN OF CARE
Pt tolerated catheter well. 400mL clear pink urine. One big clot noted. MD notified. Educated him on harris care and education. Discharge instructions given to pt and spouse, verbalized understanding and questions answered. Handouts provided. Belongings given back to pt. IV removed. Discharge via wheelchair.

## 2020-11-09 ENCOUNTER — CLINICAL SUPPORT (OUTPATIENT)
Dept: UROLOGY | Facility: CLINIC | Age: 65
End: 2020-11-09
Payer: MEDICARE

## 2020-11-09 ENCOUNTER — TELEPHONE (OUTPATIENT)
Dept: UROLOGY | Facility: CLINIC | Age: 65
End: 2020-11-09

## 2020-11-09 VITALS
TEMPERATURE: 98 F | HEART RATE: 60 BPM | DIASTOLIC BLOOD PRESSURE: 74 MMHG | SYSTOLIC BLOOD PRESSURE: 126 MMHG | OXYGEN SATURATION: 96 % | RESPIRATION RATE: 18 BRPM

## 2020-11-09 DIAGNOSIS — R33.9 URINARY RETENTION: Primary | ICD-10-CM

## 2020-11-09 PROCEDURE — 99211 PR OFFICE/OUTPT VISIT, EST, LEVL I: ICD-10-PCS | Mod: S$PBB,,, | Performed by: STUDENT IN AN ORGANIZED HEALTH CARE EDUCATION/TRAINING PROGRAM

## 2020-11-09 PROCEDURE — 99211 OFF/OP EST MAY X REQ PHY/QHP: CPT | Mod: S$PBB,,, | Performed by: STUDENT IN AN ORGANIZED HEALTH CARE EDUCATION/TRAINING PROGRAM

## 2020-11-09 NOTE — TELEPHONE ENCOUNTER
----- Message from Jaelyn Porter MD sent at 11/6/2020  6:41 PM CST -----  Please schedule nurse visit on Monday for voiding trial. Thanks!

## 2020-11-10 LAB
COMPN STONE: NORMAL
FINAL PATHOLOGIC DIAGNOSIS: NORMAL
SPECIMEN SOURCE: NORMAL
STONE ANALYSIS IR-IMP: NORMAL

## 2020-11-12 ENCOUNTER — TELEPHONE (OUTPATIENT)
Dept: UROLOGY | Facility: CLINIC | Age: 65
End: 2020-11-12

## 2020-11-12 NOTE — TELEPHONE ENCOUNTER
I spoke with the pt and advised him that he has to have pre procedure Covid test done here at [facility]. Understanding verbalized.

## 2020-11-12 NOTE — TELEPHONE ENCOUNTER
----- Message from Zee Santillan sent at 11/12/2020 10:08 AM CST -----  Regarding: Advice  Contact: patient  Type: Needs Medical Advice    Who Called:  patient  Symptoms (please be specific):  n/a  How long has patient had these symptoms:  n/a  Pharmacy name and phone #:  n.a  Best Call Back Number: [phone] (home)     Additional Information: pt would like to schedule to have labs done in [place] at [facility] [phone]

## 2020-11-13 ENCOUNTER — TELEPHONE (OUTPATIENT)
Dept: SURGERY | Facility: CLINIC | Age: 65
End: 2020-11-13

## 2020-11-13 NOTE — TELEPHONE ENCOUNTER
----- Message from Dave Heller sent at 11/13/2020  3:32 PM CST -----  Type: Needs Medical Advice  Who Called:  Patient    Pharmacy name and phone #:    [id number] - [address] [address]  Phone: [phone] Fax: [phone]      Best Call Back Number:  [phone]  Additional Information: Patient states that his prescribed antibiotics (Cephalexin) have given him diarrhea and would like an alternative called in.    Please call to advise

## 2020-11-15 ENCOUNTER — LAB VISIT (OUTPATIENT)
Dept: PRIMARY CARE CLINIC | Facility: CLINIC | Age: 65
End: 2020-11-15
Payer: MEDICARE

## 2020-11-15 DIAGNOSIS — N20.1 LEFT URETERAL CALCULUS: ICD-10-CM

## 2020-11-15 PROCEDURE — U0003 INFECTIOUS AGENT DETECTION BY NUCLEIC ACID (DNA OR RNA); SEVERE ACUTE RESPIRATORY SYNDROME CORONAVIRUS 2 (SARS-COV-2) (CORONAVIRUS DISEASE [COVID-19]), AMPLIFIED PROBE TECHNIQUE, MAKING USE OF HIGH THROUGHPUT TECHNOLOGIES AS DESCRIBED BY CMS-2020-01-R: HCPCS

## 2020-11-16 LAB — SARS-COV-2 RNA RESP QL NAA+PROBE: NOT DETECTED

## 2020-11-18 ENCOUNTER — TELEPHONE (OUTPATIENT)
Dept: UROLOGY | Facility: CLINIC | Age: 65
End: 2020-11-18

## 2020-11-18 ENCOUNTER — HOSPITAL ENCOUNTER (OUTPATIENT)
Facility: AMBULARY SURGERY CENTER | Age: 65
Discharge: HOME | End: 2020-11-18
Attending: STUDENT IN AN ORGANIZED HEALTH CARE EDUCATION/TRAINING PROGRAM | Admitting: STUDENT IN AN ORGANIZED HEALTH CARE EDUCATION/TRAINING PROGRAM
Payer: MEDICARE

## 2020-11-18 DIAGNOSIS — N20.1 LEFT URETERAL CALCULUS: Primary | ICD-10-CM

## 2020-11-18 DIAGNOSIS — N20.1 URETERAL STONE: ICD-10-CM

## 2020-11-18 PROCEDURE — 52310 PR CYSTOSCOPY,REMV CALCULUS,SIMPLE: ICD-10-PCS | Mod: ,,, | Performed by: STUDENT IN AN ORGANIZED HEALTH CARE EDUCATION/TRAINING PROGRAM

## 2020-11-18 PROCEDURE — 52310 CYSTOSCOPY AND TREATMENT: CPT | Mod: ,,, | Performed by: STUDENT IN AN ORGANIZED HEALTH CARE EDUCATION/TRAINING PROGRAM

## 2020-11-18 PROCEDURE — 52310 CYSTOSCOPY AND TREATMENT: CPT | Performed by: STUDENT IN AN ORGANIZED HEALTH CARE EDUCATION/TRAINING PROGRAM

## 2020-11-18 RX ORDER — LIDOCAINE HYDROCHLORIDE 20 MG/ML
JELLY TOPICAL
Status: DISCONTINUED | OUTPATIENT
Start: 2020-11-18 | End: 2020-11-18 | Stop reason: HOSPADM

## 2020-11-18 RX ORDER — SULFAMETHOXAZOLE AND TRIMETHOPRIM 800; 160 MG/1; MG/1
1 TABLET ORAL 2 TIMES DAILY
Qty: 2 TABLET | Refills: 0 | Status: SHIPPED | OUTPATIENT
Start: 2020-11-18 | End: 2020-11-19

## 2020-11-18 RX ORDER — WATER 1000 ML/1000ML
INJECTION, SOLUTION INTRAVENOUS
Status: DISCONTINUED | OUTPATIENT
Start: 2020-11-18 | End: 2020-11-18 | Stop reason: HOSPADM

## 2020-11-18 NOTE — DISCHARGE INSTRUCTIONS
Before leaving, please make sure you have all your personal belongings such as glasses, purses, wallets, keys, cell phones, jewelry, jackets etc          After the procedure    · Drink plenty of fluids.  · You may have burning or light bleeding when you urinate--this is normal.  · Medications may be prescribed to ease any discomfort or prevent infection. Take these as directed.  · Call your doctor if you have heavy bleeding or blood clots, burning that lasts more than a day, a fever over 100°F  (38° C), or trouble urinating.    After Surgery:  Always be aware that any surgery can cause these symptoms:    Pain- Medication can be prescribed for pain to decrease your pain but may not completely take your pain away.  Over the Counter pain medicine my be enough and you can always use Ice and rest to help ease pain.    Bleeding- a little bleeding after a surgery is usually within normal.  If there is a lot of blood you need to notify your MD.  Emergency treatments of bleeding are cold application, elevation of the bleeding site and compression.    Infection- Infection after surgery is NOT a normal occurrence.  Signs of infection are fever, swelling, hot to touch the incision.  If this occurs notify your MD immediately.    Nausea- this can be common after a surgery especially if you have had anesthesia medicine or are taking pain medicine.  Staying on clear liquids, bland foods, gingerale, or over the counter anti nausea medicines can help.  If you vomit more than once, notify your MD.  Anti Nausea medicines can be prescribed.

## 2020-11-18 NOTE — TELEPHONE ENCOUNTER
----- Message from Jaelyn Porter MD sent at 11/18/2020  9:35 AM CST -----  Please schedule follow up in 3 months with a renal US

## 2020-11-18 NOTE — OP NOTE
[facility]  Operative Note    Date: 11/18/2020    Pre-Op Diagnosis: indwelling ureteral stent s/p URS    Post-Op Diagnosis: same    Procedure(s) Performed:   1.  Flexible cystoscopy with ureteral stent removal    Specimen(s): none    Staff Surgeon: Jaelyn Porter    Anesthesia: local    Indications: Americo Dunham Jr. is a 65 y.o. male who underwent URS on 11/6. he  presents for stent removal.    Findings:   Ureteral stent removed via urethra without complication    Estimated Blood Loss: none    Drains: none    Procedure in Detail:  After informed consent was obtained the patient was brought to the cystoscopy suite and placed in the supine position.  He  was prepped and draped in a sterile manner. A 16 Beninese flexible cystoscope was inserted into the urethra and advanced into the urinary bladder. The stent was visualized in the bladder, and flexible graspers were used to grasp the end of the stent. It was withdrawn in its entirety through the urethra and out of the body. The patient tolerated the procedure well.    Disposition: Follow up in 3 months with a renal US.    Jaelyn Porter MD

## 2020-11-18 NOTE — INTERVAL H&P NOTE
The patient has been examined and the H&P has been reviewed:    I concur with the findings and no changes have occurred since H&P was written.    Surgery risks, benefits and alternative options discussed and understood by patient/family.    Patient is appropriate for ASC.        Active Hospital Problems    Diagnosis  POA    Ureteral stone [N20.1]  Yes      Resolved Hospital Problems   No resolved problems to display.

## 2020-11-18 NOTE — DISCHARGE SUMMARY
[facility]  Discharge Note  Short Stay    Admit Date: 11/18/2020    Discharge Date and Time: 11/18/2020 9:33 AM      Attending Physician: Jaelyn Porter MD     Discharge Provider: Jaelyn Porter    Diagnoses:  Active Hospital Problems    Diagnosis  POA    *Ureteral stone [N20.1]  Yes    BPH (benign prostatic hypertrophy) [N40.0]  Yes     Dx updated per 2019 IMO Load        Resolved Hospital Problems   No resolved problems to display.       Discharged Condition: good    Hospital Course: Patient was admitted for cysto/stent removal and tolerated the procedure well with no complications. The patient was discharged home in good condition on the same day.       Final Diagnoses: Same as principal problem.    Disposition: Home or Self Care    Follow up/Patient Instructions:    Medications:  Reconciled Home Medications:   Current Discharge Medication List      START taking these medications    Details   sulfamethoxazole-trimethoprim 800-160mg (BACTRIM DS) 800-160 mg Tab Take 1 tablet by mouth 2 (two) times daily. for 1 day  Qty: 2 tablet, Refills: 0         CONTINUE these medications which have NOT CHANGED    Details   loratadine (CLARITIN) 10 mg tablet Take 1 tablet (10 mg total) by mouth once daily.  Qty: 30 tablet, Refills: 2    Associated Diagnoses: Chronic coughing      oxybutynin (DITROPAN) 5 MG Tab Take 1 tablet (5 mg total) by mouth 3 (three) times daily as needed.  Qty: 30 tablet, Refills: 0      !! oxyCODONE-acetaminophen (PERCOCET) 5-325 mg per tablet     Comments: Quantity prescribed more than 7 day supply? Press F2 and select one:[id number]        !! oxyCODONE-acetaminophen (PERCOCET) 5-325 mg per tablet Take 1 tablet by mouth every 4 (four) hours as needed for Pain.  Qty: 15 tablet, Refills: 0      pantoprazole (PROTONIX) 40 MG tablet Take 1 tablet (40 mg total) by mouth once daily.  Qty: 30 tablet, Refills: 2    Associated Diagnoses: Chronic coughing      tamsulosin (FLOMAX) 0.4 mg Cap Take 1  capsule (0.4 mg total) by mouth once daily. Take at bedtime  Qty: 30 capsule, Refills: 3       !! - Potential duplicate medications found. Please discuss with provider.        Discharge Procedure Orders   US Retroperitoneal Complete (Kidney and   Standing Status: Future Standing Exp. Date: 11/18/21     Order Specific Question Answer Comments   May the Radiologist modify the order per protocol to meet the clinical needs of the patient? Yes      Call MD for:  temperature >100.4     Call MD for:  persistent nausea and vomiting     Call MD for:  severe uncontrolled pain     No dressing needed     Activity as tolerated     Follow-up Information     Jaelyn Porter MD In 3 months.    Specialty: Urology  Why: with renal US  Contact information:  [address] [place] [address] [address]IVE  [address]  [facility] [id number] [phone]                     Jaelyn Porter MD  Urology Department

## 2020-11-20 VITALS
WEIGHT: 141.75 LBS | SYSTOLIC BLOOD PRESSURE: 137 MMHG | OXYGEN SATURATION: 99 % | DIASTOLIC BLOOD PRESSURE: 77 MMHG | BODY MASS INDEX: 22.25 KG/M2 | HEIGHT: 67 IN | TEMPERATURE: 97 F | HEART RATE: 57 BPM | RESPIRATION RATE: 20 BRPM

## (undated) DEVICE — DRAPE MINOR PROCEDURE

## (undated) DEVICE — CONTAINER SPECIMEN STRL 4OZ

## (undated) DEVICE — SEE ITEM #152308

## (undated) DEVICE — SET CYSTO IRRIGATION UNIV SPIK

## (undated) DEVICE — GUIDEWIRE ANGIO .035INX145CM

## (undated) DEVICE — LUBRICANT SURGILUBE 2 OZ

## (undated) DEVICE — SEE MEDLINE ITEM 157116

## (undated) DEVICE — GLOVE SURGEONS ULTRA TOUCH 6.5

## (undated) DEVICE — GUIDE WIRE MOTION .035 X 150CM

## (undated) DEVICE — SCRUB HIBICLENS 4% CHG 4OZ

## (undated) DEVICE — SOL 9P NACL IRR PIC IL

## (undated) DEVICE — SYR 10CC LUER LOCK

## (undated) DEVICE — SOL IRR NACL .9% 3000ML

## (undated) DEVICE — SEE MEDLINE ITEM 157185

## (undated) DEVICE — BAG LINGEMAN DRAIN UROLOGY

## (undated) DEVICE — SPONGE SUPER KERLIX 6X6.75IN

## (undated) DEVICE — SYR ONLY LUER LOCK 20CC

## (undated) DEVICE — SLEEVE SCD EXPRESS CALF MEDIUM

## (undated) DEVICE — SET IRR URLGY 2LINE UNIV SPIKE

## (undated) DEVICE — SEE MEDLINE ITEM 152487

## (undated) DEVICE — SEE MEDLINE ITEM 152651

## (undated) DEVICE — SHEET DRAPE MEDIUM